# Patient Record
Sex: FEMALE | Race: WHITE | NOT HISPANIC OR LATINO
[De-identification: names, ages, dates, MRNs, and addresses within clinical notes are randomized per-mention and may not be internally consistent; named-entity substitution may affect disease eponyms.]

---

## 2017-03-22 PROBLEM — Z00.00 ENCOUNTER FOR PREVENTIVE HEALTH EXAMINATION: Noted: 2017-03-22

## 2017-03-23 ENCOUNTER — NON-APPOINTMENT (OUTPATIENT)
Age: 62
End: 2017-03-23

## 2017-03-23 ENCOUNTER — LABORATORY RESULT (OUTPATIENT)
Age: 62
End: 2017-03-23

## 2017-03-23 ENCOUNTER — APPOINTMENT (OUTPATIENT)
Dept: FAMILY MEDICINE | Facility: CLINIC | Age: 62
End: 2017-03-23

## 2017-03-23 VITALS — DIASTOLIC BLOOD PRESSURE: 106 MMHG | SYSTOLIC BLOOD PRESSURE: 170 MMHG | HEIGHT: 61 IN

## 2017-03-23 VITALS — SYSTOLIC BLOOD PRESSURE: 148 MMHG | DIASTOLIC BLOOD PRESSURE: 78 MMHG

## 2017-03-23 DIAGNOSIS — I10 ESSENTIAL (PRIMARY) HYPERTENSION: ICD-10-CM

## 2017-03-23 DIAGNOSIS — E03.9 HYPOTHYROIDISM, UNSPECIFIED: ICD-10-CM

## 2017-03-23 DIAGNOSIS — Z80.6 FAMILY HISTORY OF LEUKEMIA: ICD-10-CM

## 2017-03-23 RX ORDER — HYDROCHLOROTHIAZIDE 12.5 MG/1
12.5 CAPSULE ORAL DAILY
Refills: 0 | Status: ACTIVE | COMMUNITY

## 2017-03-23 RX ORDER — ATENOLOL 50 MG/1
50 TABLET ORAL TWICE DAILY
Refills: 0 | Status: ACTIVE | COMMUNITY

## 2017-03-23 RX ORDER — ENALAPRIL MALEATE 20 MG/1
20 TABLET ORAL TWICE DAILY
Refills: 0 | Status: ACTIVE | COMMUNITY

## 2017-03-30 ENCOUNTER — APPOINTMENT (OUTPATIENT)
Dept: FAMILY MEDICINE | Facility: CLINIC | Age: 62
End: 2017-03-30

## 2017-03-31 LAB
ANION GAP SERPL CALC-SCNC: 18 MMOL/L
BUN SERPL-MCNC: 21 MG/DL
CALCIUM SERPL-MCNC: 10.1 MG/DL
CHLORIDE SERPL-SCNC: 102 MMOL/L
CO2 SERPL-SCNC: 21 MMOL/L
CREAT SERPL-MCNC: 0.9 MG/DL
GLUCOSE SERPL-MCNC: 93 MG/DL
POTASSIUM SERPL-SCNC: 4.5 MMOL/L
SODIUM SERPL-SCNC: 141 MMOL/L
T3RU NFR SERPL: 1.11 INDEX
T4 FREE SERPL-MCNC: 0.7 NG/DL
THYROGLOB AB SERPL-ACNC: 6655 IU/ML
THYROPEROXIDASE AB SERPL IA-ACNC: 3696 IU/ML
TSH SERPL-ACNC: 0.78 UIU/ML

## 2017-04-07 RX ORDER — THYROID, PORCINE 60 MG/1
60 TABLET ORAL TWICE DAILY
Qty: 60 | Refills: 1 | Status: ACTIVE | COMMUNITY

## 2018-01-30 ENCOUNTER — APPOINTMENT (OUTPATIENT)
Dept: FAMILY MEDICINE | Facility: CLINIC | Age: 63
End: 2018-01-30

## 2018-10-29 ENCOUNTER — RESULT REVIEW (OUTPATIENT)
Age: 63
End: 2018-10-29

## 2020-07-10 ENCOUNTER — APPOINTMENT (OUTPATIENT)
Dept: ORTHOPEDIC SURGERY | Facility: CLINIC | Age: 65
End: 2020-07-10
Payer: MEDICARE

## 2020-07-10 VITALS
HEART RATE: 60 BPM | WEIGHT: 170 LBS | SYSTOLIC BLOOD PRESSURE: 153 MMHG | DIASTOLIC BLOOD PRESSURE: 83 MMHG | BODY MASS INDEX: 32.1 KG/M2 | HEIGHT: 61 IN

## 2020-07-10 VITALS — TEMPERATURE: 98.9 F

## 2020-07-10 PROCEDURE — 99204 OFFICE O/P NEW MOD 45 MIN: CPT

## 2020-07-10 PROCEDURE — 73521 X-RAY EXAM HIPS BI 2 VIEWS: CPT

## 2020-07-10 NOTE — PHYSICAL EXAM
[de-identified] : The patient appears well nourished and in no apparent distress. The patient is alert and oriented to person, place, and time. Affect and mood appear normal. The head is normocephalic and atraumatic. The eyes reveal normal sclera and extra ocular muscles are intact. The mucous membranes are moist. Skin shows normal turgor with no evidence of eczema or psoriasis. No respiratory distress noted. Sensation grossly intact. MUSCULOSKELETAL:   SEE BELOW\par \par \par Bilateral hip exam demonstrates equal leg lengths. No unilateral atrophy. No tenderness of the hip area with palpation. Passive range of motion of the lefft hip is 15° of internal rotation to approximately 30° of external rotation. Right hip motion is 10° and internal rotation to proximal a 45° of external rotation. Hip flexion at neutral against resistance is 4+/5 bilaterally. Hip flexion at 45° against resistance is 5/5. Hip abduction and adduction is 5/5. No weakness. No tenderness of the greater trochanteric bursas. Normal sensation to light touch bilaterally. No focal deficits. No foot drop. [de-identified] : X-rays of the pelvis and hips were reviewed. Right hip demonstrates a metal-on-metal hip prosthesis. No lucencies appreciated. No fractures. Left hip demonstrates a ceramic on polyethylene construct. No fractures.

## 2020-07-10 NOTE — DISCUSSION/SUMMARY
[de-identified] : Today's x-rays reviewed with patient. No clear signs of pathology from the previous total hip arthroplasties. She is known to have degenerative spine disease. There is concern that her symptoms are consistent with lumbar radiculopathy and possible spinal stenosis. The patient is referred for an MRI of the lumbar spine. She will then follow up with Dr. Arevalo. The patient is also prescribed Mobic. She will use this for pain control. If the patient should develop any specific groin pain or changes in her current symptoms as related to her previous hip replacements, she'll be seen back sooner. All questions were answered to the patient's satisfaction.

## 2020-07-10 NOTE — HISTORY OF PRESENT ILLNESS
[de-identified] : Patient presents today for evaluation of bilateral hip and anterior thigh discomfort. She's had this pain for 6-8 months. She reports at times does not feel steady on her legs. She occasionally has some lower back pain. She does report some radicular symptoms along the left lateral hip area. She does not report any specific right groin pain. She intermittently has some left groin pain. She believes that she has lost some left hip motion since her surgery. She underwent a posterior right hip replacement in 2009 with Dr. Chirinos. She underwent an anterior left hip replacement in 2016 with Dr. Cody. She has not had any dislocations or traumatic injuries to either hip. She does not currently use a cane or walker. She does not take any pain medicine. No fevers or chills.\par \par Review of Systems-\par Constitutional: No fever or chills. \par Cardiovascular: No orthopnea or chest pain\par Pulmonary: No shortness of breath. \par GI: No nausea or vomiting or abdominal pain.\par Musculoskeletal: see HPI \par Psychiatric: No anxiety and depression.

## 2020-09-17 ENCOUNTER — APPOINTMENT (OUTPATIENT)
Dept: MRI IMAGING | Facility: CLINIC | Age: 65
End: 2020-09-17
Payer: MEDICARE

## 2020-09-17 ENCOUNTER — OUTPATIENT (OUTPATIENT)
Dept: OUTPATIENT SERVICES | Facility: HOSPITAL | Age: 65
LOS: 1 days | End: 2020-09-17
Payer: MEDICARE

## 2020-09-17 DIAGNOSIS — Z98.49 CATARACT EXTRACTION STATUS, UNSPECIFIED EYE: Chronic | ICD-10-CM

## 2020-09-17 DIAGNOSIS — Z96.649 PRESENCE OF UNSPECIFIED ARTIFICIAL HIP JOINT: Chronic | ICD-10-CM

## 2020-09-17 PROCEDURE — 72148 MRI LUMBAR SPINE W/O DYE: CPT | Mod: 26

## 2020-09-17 PROCEDURE — 72148 MRI LUMBAR SPINE W/O DYE: CPT

## 2020-10-08 ENCOUNTER — APPOINTMENT (OUTPATIENT)
Dept: ORTHOPEDIC SURGERY | Facility: CLINIC | Age: 65
End: 2020-10-08
Payer: MEDICARE

## 2020-10-08 VITALS
WEIGHT: 170 LBS | HEART RATE: 57 BPM | HEIGHT: 61 IN | BODY MASS INDEX: 32.1 KG/M2 | DIASTOLIC BLOOD PRESSURE: 85 MMHG | SYSTOLIC BLOOD PRESSURE: 152 MMHG

## 2020-10-08 VITALS — TEMPERATURE: 96.2 F

## 2020-10-08 DIAGNOSIS — Z86.39 PERSONAL HISTORY OF OTHER ENDOCRINE, NUTRITIONAL AND METABOLIC DISEASE: ICD-10-CM

## 2020-10-08 DIAGNOSIS — Z80.6 FAMILY HISTORY OF LEUKEMIA: ICD-10-CM

## 2020-10-08 DIAGNOSIS — Z86.79 PERSONAL HISTORY OF OTHER DISEASES OF THE CIRCULATORY SYSTEM: ICD-10-CM

## 2020-10-08 PROCEDURE — 99214 OFFICE O/P EST MOD 30 MIN: CPT

## 2020-10-08 PROCEDURE — 72100 X-RAY EXAM L-S SPINE 2/3 VWS: CPT

## 2020-10-08 NOTE — DISCUSSION/SUMMARY
[de-identified] : Patient has been referred to physiatry for assessment regarding injections to help manage pain. I do not think this pt is an operative candidate I wouldn't’ recommend a multiple level fusion surgery in an adult Rotoscoliosis patient to address bilateral foraminal narrowing.

## 2020-10-08 NOTE — PHYSICAL EXAM
[Obese] : obese [Poor Appearance] : well-appearing [Acute Distress] : not in acute distress [de-identified] : CONSTITUTIONAL: The patient is a very pleasant individual who is well-nourished and who appears stated age.\par PSYCHIATRIC: The patient is alert and oriented X 3 and in no apparent distress, and participates with orthopedic evaluation well.\par HEAD: Atraumatic and is nonsyndromic in appearance.\par EENT: No visible thyromegaly, EOMI.\par RESPIRATORY: Respiratory rate is regular, not dyspneic on examination.\par LYMPHATICS: There is no inguinal lymphadenopathy\par INTEGUMENTARY: Skin is clean, dry, and intact about the bilateral lower extremities and lumbar spine.\par VASCULAR: There is brisk capillary refill about the bilateral lower extremities.\par NEUROLOGIC: There are no pathologic reflexes. Deep tendon reflexes are well maintained at 2+/4 of the bilateral lower extremities and are symmetric.\par MUSCULOSKELETAL: There is no visible muscular atrophy. Manual motor strength is well maintained in the bilateral lower extremities. Range of motion of lumbar spine is well maintained. The patient ambulates in a non-myelopathic manner. Negative tension sign and straight leg raise bilaterally. Quad extension, ankle dorsiflexion, EHL, plantar flexion, and ankle eversion are well preserved. Normal secondary orthopaedic exam of bilateral hips, greater trochanteric area, knees and ankles \par \par Pain start in the lateral aspects of the hips and radiating to the legs, subjective radiculopathy in the left leg to the bottom of the foot.  [de-identified] : Lumbar MRI from [facility] done on 09/17/2020 demonstrates lumbar degenerative disc disease, no signifcant central canal stenosis, moderate stenosis at L4-L5. \par \par Xray of the lumbar spine taken 10/08/2020 demonstrates degenerative Rotoscoliosis with a convexity to the left.

## 2020-10-08 NOTE — ADDENDUM
[FreeTextEntry1] : Documented by Remington Robledo acting as a scribe for Dr. Jordan Arevalo on 10/08/2020. All medical record entries made by the Scribe were at my, Dr. Jordan Arevalo, direction and personally dictated by me on 10/08/2020 . I have reviewed the chart and agree that the record accurately reflects my personal performance of the history, physical exam, assessment and plan. I have also personally directed, reviewed, and agreed with the chart.

## 2020-10-08 NOTE — HISTORY OF PRESENT ILLNESS
[de-identified] : 65 year old  F Presents from a referral from Dr. Cody with paresthesia in BL LE persisting close to a year.  Patient states the pain is different in each LE. RLE patient states localized pain in the thigh, in the LLE she complains of paresthesia, feelings of temperature changes, and trouble with her footing. No conservative  treatment at this time. JANINE questionnaire negative.  [Ataxia] : no ataxia [Incontinence] : no incontinence [Loss of Dexterity] : good dexterity [Urinary Ret.] : no urinary retention

## 2020-10-21 ENCOUNTER — APPOINTMENT (OUTPATIENT)
Dept: PHYSICAL MEDICINE AND REHAB | Facility: CLINIC | Age: 65
End: 2020-10-21
Payer: MEDICARE

## 2020-10-21 VITALS
DIASTOLIC BLOOD PRESSURE: 81 MMHG | TEMPERATURE: 96.3 F | WEIGHT: 170 LBS | HEIGHT: 61 IN | BODY MASS INDEX: 32.1 KG/M2 | HEART RATE: 58 BPM | SYSTOLIC BLOOD PRESSURE: 132 MMHG

## 2020-10-21 PROCEDURE — 99204 OFFICE O/P NEW MOD 45 MIN: CPT

## 2020-10-21 RX ORDER — MELOXICAM 7.5 MG/1
7.5 TABLET ORAL TWICE DAILY
Qty: 30 | Refills: 1 | Status: DISCONTINUED | COMMUNITY
Start: 2020-07-10 | End: 2020-10-21

## 2020-10-21 NOTE — HISTORY OF PRESENT ILLNESS
[FreeTextEntry1] : Ms. LEANDRA WHITTAKER is a 65 year old  female who presents with tingling in his bilateral legs\par \par Location:Bilateral legs, on left side runs from anteriolateral thigh to lateral/anterior leg into the bottom of her left foot. On the right side, only pain in the anterior thigh\par Onset: Started about 6 months ago, no trauma or inciting event\par Provocation/Palliative:Doesn't change with position, can wake her up at night. \par Quality:Tingling in left leg/tightness\par Radiation:Down her left lateral/anterior thigh down lateral/anterior lower leg into bottom of left toes \par Severity:4-5/10\par Timing:Discomfort as been staying about the same\par \par Denies any associated leg weakness but admits to instability. Denies any loss of bowel/bladder control or any groin numbness.\par Previous medications trialed:Mobic/Motrin/Tylenol\par Previous procedures relevant to complaint:Hip injections in past with no relief\par Conservative therapy tried?:NSAIDs\par

## 2020-10-21 NOTE — DATA REVIEWED
[MRI] : MRI [FreeTextEntry1] : MRI L Spine 9/2020: multilevel spondylosis, bilateral foraminal narrowing L4-5\par \par \par   MR Lumbar Spine No Cont             Final\par \par No Documents Attached\par \par \par \par \par   EXAM:  MR SPINE LUMBAR\par \par \par PROCEDURE DATE:  09/17/2020\par \par \par \par INTERPRETATION:  CLINICAL INFORMATION: LUMBAR SPONDYLOSIS. . ADMDIAG1: M47.816 SPONDYLOSIS W/O MYELOPATHY OR RADICULOPATHY, LUMBAR REGION/. Bilateral anterior thigh pain.\par \par TECHNIQUE: Multiplanar multisequence MR of the lumbar spine was performed without intravenous contrast.\par \par COMPARISON: MRI lumbar spine 8/16/2015\par \par FINDINGS:\par \par Lumbar levoscoliosis. Grade 1 retrolisthesis at L2-L3 and L3-L4. Vertebral body heights are within normal limits. Heterogeneous T1 marrow signal. Multilevel intervertebral disc desiccation.\par \par Evaluation of individual levels demonstrates:\par \par L1-L2: Disc bulge. Mild indentation of the thecal sac. No significant neuroforaminal narrowing.\par \par L2-L3: Disc bulge. Mild indentation of the thecal sac. Mild left and moderate right neuroforaminal narrowing.\par \par L3-L4: Disc bulge. Mild indentation of the thecal sac. Mild bilateral neuroforaminal narrowing.\par \par L4-L5: Disc bulge. Bilateral facet arthrosis. Mild indentation of the thecal sac. Mild left and moderate right neuroforaminal narrowing.\par \par L5-S1: Disc bulge. Bilateral facet arthrosis. No significant spinal canal or neuroforaminal narrowing.\par \par The conus is normal in position and morphology at the L1-L2 level.\par \par There is no definite fluid collection or mass lesion within the visualized retroperitoneal or posterior paraspinal soft tissues.\par \par \par IMPRESSION: Lumbar degenerative changes. Bilateral neuroforaminal narrowing at L2-L3 through L4-L5.\par \par \par \par \par \par \par KAYLYNN FERRARA M.D., ATTENDING RADIOLOGIST\par This document has been electronically signed. Sep 17 2020  3:27PM\par \par  \par \par  Ordered by: BRITNEY ALMANZA       Collected/Examined: 17Sep2020 11:15AM       \par Verified by: BRITNEY ALMANZA [id number] 10:57AM       \par  Result Communication: No patient communication needed at this time;\par Stage: Final       \par  Performed at: [facility]       Resulted: [id number] 03:31PM       Last Updated: 19Sep2020 10:57AM       Accession: [id number]

## 2020-10-21 NOTE — PHYSICAL EXAM
[FreeTextEntry1] : PE:\par Constitutional: In NAD, calm and cooperative\par HEENT: NCAT, Anicteric sclera, no lid-lag\par Cardio: Extremities appear pink and well perfused, no peripheral edema\par Respiratory: Normal respiratory effort on room air, no accessory muscle use\par Skin: no rashes seen on exposed skin, no visible abrasions\par Psych: Normal affect, intact judgment and insight\par Neuro: Awake, alert and oriented x 3, see below for focused neurological exam\par MSK (Low back):\par 	Inspection: no gross swelling identified\par 	Palpation: No tenderness of the bilateral  lumbar paraspinals\par 	ROM: No pain with lumbar extension or flexion\par 	Strength: 5/5 strength in bilateral lower extremities except for L hip flexion which is 4/5, as well as left planatflexion with is 4+/5\par 	Reflexes: 1+ Patella reflex bilaterally, 1+ Achilles reflex bilaterally, negative clonus bilaterally\par 	Sensation: Intact to light touch in bilateral lower extremities but decreased to pinprick in bilateral LE circumferentially, L worse than R\par 	Special tests: seated SLR negative bilaterally, JOSSY negative bilaterally, FADIR negative bilaterally, Facet loading negative bilaterally\par

## 2020-10-21 NOTE — ASSESSMENT
[FreeTextEntry1] : Ms. LEANDRA WHITTAKER is a pleasant 65 year old  female who presents with bilateral leg tingling/pain for 6 months without any inciting event. Patient seen by ortho spine who did not recommend any type of surgery. MRI L Spine shows some rotoscoliosis and mild to moderate bilateral foraminal narrowing in the lower lumbar segments. Patient's discomfort could be from a lumbar radiculopathy, although she denies any LBP. Since patient could have an underlying neuropathy, will send for NCS/EMG studies as well as refer patient to neurologist for possible neuropathy workup. In the meantime, patient will start PT focusing on balance and gait training to help prevent any falls. I offered patient a trial of gabapentin, but patient does not wish to try any new medication at this time. She will f/u with me in about a month once the NCS/EMG is done and she has seen neurology.

## 2020-10-27 ENCOUNTER — NON-APPOINTMENT (OUTPATIENT)
Age: 65
End: 2020-10-27

## 2020-10-28 ENCOUNTER — TRANSCRIPTION ENCOUNTER (OUTPATIENT)
Age: 65
End: 2020-10-28

## 2020-11-19 ENCOUNTER — APPOINTMENT (OUTPATIENT)
Dept: PHYSICAL MEDICINE AND REHAB | Facility: CLINIC | Age: 65
End: 2020-11-19
Payer: MEDICARE

## 2020-11-19 DIAGNOSIS — M62.838 OTHER MUSCLE SPASM: ICD-10-CM

## 2020-11-19 PROCEDURE — 95911 NRV CNDJ TEST 9-10 STUDIES: CPT

## 2020-11-19 PROCEDURE — 95909 NRV CNDJ TST 5-6 STUDIES: CPT

## 2020-11-25 ENCOUNTER — APPOINTMENT (OUTPATIENT)
Dept: PHYSICAL MEDICINE AND REHAB | Facility: CLINIC | Age: 65
End: 2020-11-25
Payer: MEDICARE

## 2020-11-25 VITALS
DIASTOLIC BLOOD PRESSURE: 87 MMHG | HEART RATE: 60 BPM | RESPIRATION RATE: 14 BRPM | HEIGHT: 61 IN | BODY MASS INDEX: 30.21 KG/M2 | SYSTOLIC BLOOD PRESSURE: 154 MMHG | WEIGHT: 160 LBS | TEMPERATURE: 96.4 F | OXYGEN SATURATION: 98 %

## 2020-11-25 PROCEDURE — 99214 OFFICE O/P EST MOD 30 MIN: CPT

## 2020-11-25 NOTE — ASSESSMENT
[FreeTextEntry1] : Ms. LEANDRA WHITTAKER is a 65 year old  female who presents with bilateral leg numbness/tingling of unknown cause, pending completion of an EMG. Patient has been improving with Gabapentin, now at 300mg TID. Patient was told that she can increase this again to 600mg TID (starting 300mg at a time), but patient says the discomfort is tolerable at this time. For now, will place referral to neurology for their opinion on any causes of this numbness/tingling. She will continue PT in the meantime for balance and gait training.  Patient to return after EMG/neurology consultation. Patient educated on red flag signs including any changes to his bowel/bladder control, groin numbness or new weakness. Patient knows to seek immediate attention by calling 911 or going to nearest ER if these symptoms appear.

## 2020-11-25 NOTE — DATA REVIEWED
[MRI] : MRI [FreeTextEntry1] : MRI L Spine 9/2020: multilevel spondylosis, bilateral foraminal narrowing L4-5\par \par \par   MR Lumbar Spine No Cont             Final\par \par No Documents Attached\par \par \par \par \par   EXAM:  MR SPINE LUMBAR\par \par \par PROCEDURE DATE:  09/17/2020\par \par \par \par INTERPRETATION:  CLINICAL INFORMATION: LUMBAR SPONDYLOSIS. . ADMDIAG1: M47.816 SPONDYLOSIS W/O MYELOPATHY OR RADICULOPATHY, LUMBAR REGION/. Bilateral anterior thigh pain.\par \par TECHNIQUE: Multiplanar multisequence MR of the lumbar spine was performed without intravenous contrast.\par \par COMPARISON: MRI lumbar spine 8/16/2015\par \par FINDINGS:\par \par Lumbar levoscoliosis. Grade 1 retrolisthesis at L2-L3 and L3-L4. Vertebral body heights are within normal limits. Heterogeneous T1 marrow signal. Multilevel intervertebral disc desiccation.\par \par Evaluation of individual levels demonstrates:\par \par L1-L2: Disc bulge. Mild indentation of the thecal sac. No significant neuroforaminal narrowing.\par \par L2-L3: Disc bulge. Mild indentation of the thecal sac. Mild left and moderate right neuroforaminal narrowing.\par \par L3-L4: Disc bulge. Mild indentation of the thecal sac. Mild bilateral neuroforaminal narrowing.\par \par L4-L5: Disc bulge. Bilateral facet arthrosis. Mild indentation of the thecal sac. Mild left and moderate right neuroforaminal narrowing.\par \par L5-S1: Disc bulge. Bilateral facet arthrosis. No significant spinal canal or neuroforaminal narrowing.\par \par The conus is normal in position and morphology at the L1-L2 level.\par \par There is no definite fluid collection or mass lesion within the visualized retroperitoneal or posterior paraspinal soft tissues.\par \par \par IMPRESSION: Lumbar degenerative changes. Bilateral neuroforaminal narrowing at L2-L3 through L4-L5.\par \par \par \par \par \par \par KAYLYNN FERRARA M.D., ATTENDING RADIOLOGIST\par This document has been electronically signed. Sep 17 2020  3:27PM\par \par  \par \par  Ordered by: BRITNEY ALMANZA       Collected/Examined: 17Sep2020 11:15AM       \par Verified by: BRITNEY ALMANZA [id number] 10:57AM       \par  Result Communication: No patient communication needed at this time;\par Stage: Final       \par  Performed at: [facility]       Resulted: [id number] 03:31PM       Last Updated: 19Sep2020 10:57AM       Accession: [id number]

## 2020-11-25 NOTE — PHYSICAL EXAM
[FreeTextEntry1] : PE:\par Constitutional: In NAD, calm and cooperative\par HEENT: NCAT, Anicteric sclera, no lid-lag\par Cardio: Extremities appear pink and well perfused, no peripheral edema\par Respiratory: Normal respiratory effort on room air, no accessory muscle use\par Skin: no rashes seen on exposed skin, no visible abrasions\par Psych: Normal affect, intact judgment and insight\par Neuro: Awake, alert and oriented x 3, see below for focused neurological exam\par MSK (Low back):\par 	Inspection: no gross swelling identified\par 	Palpation: No tenderness of the bilateral  lumbar paraspinals\par 	ROM: No pain with lumbar extension or flexion\par 	Strength: 5/5 strength in bilateral lower extremities except for L hip flexion which is 4/5, as well as left planatflexion with is 4+/5\par 	Reflexes: 1+ Patella reflex bilaterally, 1+ Achilles reflex bilaterally, negative clonus bilaterally\par 	Sensation: Intact to light touch in bilateral lower extremities\par 	Special tests: seated SLR negative bilaterally, JOSSY negative bilaterally, FADIR negative bilaterally\par

## 2020-11-25 NOTE — HISTORY OF PRESENT ILLNESS
[FreeTextEntry1] : Ms. LEANDRA WHITTAKER is a 65 year old  female who presents for follow up. She is pending the 2nd part of her EMG for which she will be getting done next week. \par \par Location:Bilateral legs, on left side runs from anteriolateral thigh to lateral/anterior leg into the bottom of her left foot. On the right side, only pain in the anterior thigh\par Onset: Started about 6 months ago, no trauma or inciting event\par Provocation/Palliative:Doesn't change with position, can wake her up at night. \par Quality:Tingling in left leg/tightness\par Radiation:Down her left lateral/anterior thigh down lateral/anterior lower leg into bottom of left toes \par Severity:2-3/10\par Timing:Discomfort has been improving with gabapentin. \par \par \par No bowel/bladder changes. No groin numbness.

## 2020-12-09 ENCOUNTER — APPOINTMENT (OUTPATIENT)
Dept: PHYSICAL MEDICINE AND REHAB | Facility: CLINIC | Age: 65
End: 2020-12-09
Payer: MEDICARE

## 2020-12-09 VITALS
DIASTOLIC BLOOD PRESSURE: 83 MMHG | HEART RATE: 62 BPM | SYSTOLIC BLOOD PRESSURE: 161 MMHG | TEMPERATURE: 97.6 F | WEIGHT: 160 LBS | HEIGHT: 61 IN | BODY MASS INDEX: 30.21 KG/M2

## 2020-12-09 DIAGNOSIS — R20.0 ANESTHESIA OF SKIN: ICD-10-CM

## 2020-12-09 DIAGNOSIS — M54.16 RADICULOPATHY, LUMBAR REGION: ICD-10-CM

## 2020-12-09 PROCEDURE — 95886 MUSC TEST DONE W/N TEST COMP: CPT

## 2020-12-09 PROCEDURE — 95911 NRV CNDJ TEST 9-10 STUDIES: CPT

## 2020-12-16 ENCOUNTER — APPOINTMENT (OUTPATIENT)
Dept: PHYSICAL MEDICINE AND REHAB | Facility: CLINIC | Age: 65
End: 2020-12-16

## 2021-01-07 ENCOUNTER — APPOINTMENT (OUTPATIENT)
Dept: NEUROLOGY | Facility: CLINIC | Age: 66
End: 2021-01-07
Payer: MEDICARE

## 2021-01-07 VITALS
DIASTOLIC BLOOD PRESSURE: 90 MMHG | BODY MASS INDEX: 30.21 KG/M2 | WEIGHT: 160 LBS | SYSTOLIC BLOOD PRESSURE: 140 MMHG | TEMPERATURE: 97.2 F | HEIGHT: 61 IN

## 2021-01-07 PROCEDURE — 99204 OFFICE O/P NEW MOD 45 MIN: CPT

## 2021-01-07 NOTE — ASSESSMENT
[FreeTextEntry1] : This is a 65-year-old woman with findings consistent with peripheral neuropathy which was suggested on EMG testing.\par She will require blood testing for secondary causes of neuropathy.\par \par She had tried gabapentin with some relief of the pain.\par I have recommended she resume 300 mg twice per day to start with. (She had some sedation with the medication).\par \par I will see her back in 3 months.

## 2021-01-07 NOTE — CONSULT LETTER
[Dear  ___] : Dear  [unfilled], [Courtesy Letter:] : I had the pleasure of seeing your patient, [unfilled], in my office today. [Please see my note below.] : Please see my note below. [Consult Closing:] : Thank you very much for allowing me to participate in the care of this patient.  If you have any questions, please do not hesitate to contact me. [Sincerely,] : Sincerely, [FreeTextEntry3] : Christopher Mccann MD.

## 2021-01-07 NOTE — DATA REVIEWED
[de-identified] : EMG and nerve conduction studies of the lower extremities or suspicious for mild peripheral neuropathy.\par \par Lumbar spine MRI was performed on 9/17/2020.\par The study demonstrated multilevel degenerative changes and disc bulges.

## 2021-01-07 NOTE — PHYSICAL EXAM
[General Appearance - Alert] : alert [General Appearance - In No Acute Distress] : in no acute distress [Oriented To Time, Place, And Person] : oriented to person, place, and time [Affect] : the affect was normal [Memory Recent] : recent memory was not impaired [Memory Remote] : remote memory was not impaired [Cranial Nerves Optic (II)] : visual acuity intact bilaterally,  visual fields full to confrontation, pupils equal round and reactive to light [Cranial Nerves Oculomotor (III)] : extraocular motion intact [Cranial Nerves Trigeminal (V)] : facial sensation intact symmetrically [Cranial Nerves Facial (VII)] : face symmetrical [Cranial Nerves Vestibulocochlear (VIII)] : hearing was intact bilaterally [Cranial Nerves Glossopharyngeal (IX)] : tongue and palate midline [Cranial Nerves Accessory (XI - Cranial And Spinal)] : head turning and shoulder shrug symmetric [Cranial Nerves Hypoglossal (XII)] : there was no tongue deviation with protrusion [Motor Tone] : muscle tone was normal in all four extremities [Motor Strength] : muscle strength was normal in all four extremities [Sensation Tactile Decrease] : light touch was intact [Sensation Pain / Temperature Decrease] : pain and temperature was intact [Abnormal Walk] : normal gait [1+] : Ankle jerk left 1+ [Optic Disc Abnormality] : the optic disc were normal in size and color [Dysarthria] : no dysarthria [Aphasia] : no dysphasia/aphasia [Romberg's Sign] : Romberg's sign was negtive [Coordination - Dysmetria Impaired Finger-to-Nose Bilateral] : not present [Plantar Reflex Right Only] : normal on the right [Plantar Reflex Left Only] : normal on the left

## 2021-01-08 LAB
ALBUMIN MFR SERPL ELPH: 55.1 %
ALBUMIN SERPL-MCNC: 4.3 G/DL
ALBUMIN/GLOB SERPL: 1.2 RATIO
ALPHA1 GLOB MFR SERPL ELPH: 3.6 %
ALPHA1 GLOB SERPL ELPH-MCNC: 0.3 G/DL
ALPHA2 GLOB MFR SERPL ELPH: 9.6 %
ALPHA2 GLOB SERPL ELPH-MCNC: 0.7 G/DL
B-GLOBULIN MFR SERPL ELPH: 13.6 %
B-GLOBULIN SERPL ELPH-MCNC: 1.1 G/DL
ERYTHROCYTE [SEDIMENTATION RATE] IN BLOOD BY WESTERGREN METHOD: 30 MM/HR
ESTIMATED AVERAGE GLUCOSE: 114 MG/DL
FOLATE SERPL-MCNC: 19.8 NG/ML
GAMMA GLOB FLD ELPH-MCNC: 1.4 G/DL
GAMMA GLOB MFR SERPL ELPH: 18.1 %
HBA1C MFR BLD HPLC: 5.6 %
INTERPRETATION SERPL IEP-IMP: NORMAL
PROT SERPL-MCNC: 7.8 G/DL
PROT SERPL-MCNC: 7.8 G/DL
VIT B12 SERPL-MCNC: 697 PG/ML

## 2021-01-11 ENCOUNTER — APPOINTMENT (OUTPATIENT)
Dept: NEUROLOGY | Facility: CLINIC | Age: 66
End: 2021-01-11

## 2021-01-15 ENCOUNTER — NON-APPOINTMENT (OUTPATIENT)
Age: 66
End: 2021-01-15

## 2021-01-15 LAB — METHYLMALONATE SERPL-SCNC: 272 NMOL/L

## 2021-04-15 ENCOUNTER — APPOINTMENT (OUTPATIENT)
Dept: NEUROLOGY | Facility: CLINIC | Age: 66
End: 2021-04-15
Payer: MEDICARE

## 2021-04-15 VITALS
TEMPERATURE: 97.5 F | WEIGHT: 160 LBS | HEIGHT: 61 IN | DIASTOLIC BLOOD PRESSURE: 70 MMHG | SYSTOLIC BLOOD PRESSURE: 128 MMHG | BODY MASS INDEX: 30.21 KG/M2

## 2021-04-15 DIAGNOSIS — R26.89 OTHER ABNORMALITIES OF GAIT AND MOBILITY: ICD-10-CM

## 2021-04-15 DIAGNOSIS — G62.9 POLYNEUROPATHY, UNSPECIFIED: ICD-10-CM

## 2021-04-15 PROCEDURE — 99213 OFFICE O/P EST LOW 20 MIN: CPT

## 2021-04-15 RX ORDER — GABAPENTIN 300 MG/1
300 CAPSULE ORAL TWICE DAILY
Qty: 60 | Refills: 3 | Status: COMPLETED | COMMUNITY
Start: 2020-10-27 | End: 2021-04-15

## 2021-04-15 NOTE — ASSESSMENT
[FreeTextEntry1] : This is a 66 year-old woman with findings consistent with peripheral neuropathy which was suggested on EMG testing.\par Blood testing for secondary causes was unrevealing.\par \par She will discontinue gabapentin for now.\par \par If she develops any significant neuropathic pain I would begin a trial of Cymbalta.\par \par I will see her back in 4 months.

## 2021-04-15 NOTE — HISTORY OF PRESENT ILLNESS
[FreeTextEntry1] : I saw this patient in the office today.\par \par As you recall, she reported that she underwent hip replacement on the right in January of 2009 and on the left in June of 2016.\par Ever since then she has noticed pain down the right thigh into the leg. She also has numbness and tingling in the left thigh radiating down into the toes. She has pain at the left hip radiating into the thigh.\par She also has difficulty with balance.\par She had seen a physiatrist and EMG was performed.\par She was found to have findings suspicious for mild peripheral neuropathy with demyelinating characteristics.\par \par She was also found to have degenerative changes in the lumbar spine.\par \par She did physical therapy, but reports there was no improvement.\par \par She reports that gabapentin has not been doing much for her, however, she was expecting it to get rid of the numbness.\par She does not have much in the way of neuropathic pain.

## 2021-04-15 NOTE — DATA REVIEWED
[de-identified] : EMG and nerve conduction studies of the lower extremities or suspicious for mild peripheral neuropathy.\par \par Lumbar spine MRI was performed on 9/17/2020.\par The study demonstrated multilevel degenerative changes and disc bulges.\par \par A1C: 5.6\par B12: 697\par ESR: 30\par SPEP: normal.\par

## 2021-05-12 ENCOUNTER — NON-APPOINTMENT (OUTPATIENT)
Age: 66
End: 2021-05-12

## 2021-08-14 ENCOUNTER — RX RENEWAL (OUTPATIENT)
Age: 66
End: 2021-08-14

## 2021-08-19 ENCOUNTER — APPOINTMENT (OUTPATIENT)
Dept: NEUROLOGY | Facility: CLINIC | Age: 66
End: 2021-08-19

## 2021-12-15 ENCOUNTER — RX RENEWAL (OUTPATIENT)
Age: 66
End: 2021-12-15

## 2022-02-11 ENCOUNTER — RX RENEWAL (OUTPATIENT)
Age: 67
End: 2022-02-11

## 2022-03-17 ENCOUNTER — RX RENEWAL (OUTPATIENT)
Age: 67
End: 2022-03-17

## 2022-05-12 ENCOUNTER — RX RENEWAL (OUTPATIENT)
Age: 67
End: 2022-05-12

## 2022-07-28 RX ORDER — AZITHROMYCIN 500 MG/1
500 TABLET, FILM COATED ORAL
Qty: 6 | Refills: 3 | Status: ACTIVE | COMMUNITY
Start: 2022-07-28 | End: 1900-01-01

## 2022-08-12 ENCOUNTER — NON-APPOINTMENT (OUTPATIENT)
Age: 67
End: 2022-08-12

## 2022-08-12 ENCOUNTER — APPOINTMENT (OUTPATIENT)
Dept: ORTHOPEDIC SURGERY | Facility: CLINIC | Age: 67
End: 2022-08-12

## 2022-08-12 PROCEDURE — 73521 X-RAY EXAM HIPS BI 2 VIEWS: CPT

## 2022-08-12 PROCEDURE — 99213 OFFICE O/P EST LOW 20 MIN: CPT | Mod: 25

## 2022-08-12 PROCEDURE — 20610 DRAIN/INJ JOINT/BURSA W/O US: CPT | Mod: RT

## 2022-08-12 PROCEDURE — 73562 X-RAY EXAM OF KNEE 3: CPT | Mod: RT

## 2022-08-12 NOTE — PROCEDURE
[Injection] : Injection [Right] : of the right [Effusion] : Effusion [Osteoarthritis] : Osteoarthritis [Inflammation] : Inflammation [Diagnostic] : Diagnostic [Joint Pain] : Joint pain [Patient] : patient [Risk] : risk [Benefits] : benefits [Bleeding] : bleeding [Infection] : infection [Verbal Consent Obtained] : verbal consent was obtained prior to the procedure [Ethyl Chloride Spray] : ethyl chloride spray was used as a topical anesthetic [Medial] : medial [22] : a 22-gauge [1% Lidocaine___(mL)] : [unfilled] mL of 1% Lidocaine [Methylpred. 40mg/mL___(mL)] : [unfilled] mL of 40mg/mL methylprednisolone [Bandage Applied] : a bandage [Tolerated Well] : The patient tolerated the procedure well [None] : none [PRN] : as needed [FreeTextEntry1] : Chlorhexidine

## 2022-08-12 NOTE — PHYSICAL EXAM
[UE/LE] : Sensory: Intact in bilateral upper & lower extremities [Rad] : radial 2+ and symmetric bilaterally [Normal RUE] : Right Upper Extremity: No scars, rashes, lesions, ulcers, skin intact [Normal LUE] : Left Upper Extremity: No scars, rashes, lesions, ulcers, skin intact [Normal RLE] : Right Lower Extremity: No scars, rashes, lesions, ulcers, skin intact [Normal LLE] : Left Lower Extremity: No scars, rashes, lesions, ulcers, skin intact [Normal] : No costovertebral angle tenderness and no spinal tenderness [de-identified] : Well-healed incision to the right hip without erythema, drainage, or evidence of cellulitis.  No pain to palpation of the greater trochanteric region or the iliotibial band.  Patient has full range of motion of the right hip without pain, discomfort, instability.  Patient has a well-healed incision over the anterior thigh on the left.  No erythema drainage or evidence of cellulitis.  Patient has full range of motion of the left hip without pain, discomfort, instability.  She can flex past 100 degrees, external rotation 35 degrees, internal rotation 25 degrees, AB duction 30 degrees, adduction 25 degrees.  Calves are soft, nontender, no evidence of DVT at this time.  Patient has good motor and sensory to both legs.  Skin is clean, dry, intact to the right knee.  There is no erythema.  Crepitation at the patellofemoral joint with flexion and extension maneuvers.  No medial or lateral joint line tenderness.  Range of motion 0-1 35.  Less than 5 degree laxity with varus valgus stresses.  Negative anteroposterior drawer.  No extension lag.  No flexion contractures.\par  [de-identified] : Intact [de-identified] : Right Magnum total hip replacement, and anatomical alignment, excellent bone to prosthesis interface, there is no gross evidence of prosthetic failure, all the components anatomically aligned.\par \par Left total hip replacement, in anatomical alignment, excellent bone to prosthesis interface, there is no gross evidence of prosthetic failure, all the components anatomically aligned.\par \par Radiographs of the right knee show well-maintained joint space between the medial and lateral femoral-tibial compartments slight joint space narrowing at the patellofemoral joint.  No gross evidence of AVN\par \par \par \par \par

## 2022-08-12 NOTE — DISCUSSION/SUMMARY
[Medication Risks Reviewed] : Medication risks reviewed [Surgical risks reviewed] : Surgical risks reviewed [de-identified] : The patient is doing well status post bilateral staged total hip replacements.  The patient does have a magna metal-on-metal hip replacement we ordered blood work just as a follow-up of chromium levels, nickel and cobalt.  If these levels are elevated we will obtain an MRI to rule out any periprosthetic issues.  The patient has discomfort to the right knee for which we gave her cortisone injection to temporize her symptoms.  She will do an exercise program of walking and strength training.  If her symptoms persist in the right knee we may obtain an MRI to rule out a meniscal tear, evaluate the articular surfaces and rule out AVN.  All of her questions were answered to her satisfaction.

## 2022-08-12 NOTE — HISTORY OF PRESENT ILLNESS
[Stable] : stable [7] : a current pain level of 7/10 [5] : an average pain level of 5/10 [3] : a minimum pain level of 3/10 [9] : a maximum pain level of 9/10 [Sitting] : sitting [Standing] : standing [Lifting] : lifting [Intermit.] : ~He/She~ states the symptoms seem to be intermittent [Direct Pressure] : worsened by direct pressure [Running] : worsened by running [Walking] : worsened by walking [Acetaminophen] : relieved by acetaminophen [Exercise Regimen] : relieved by exercise regimen [Heat] : relieved by heat [Ice] : relieved by ice [NSAIDs] : relieved by nonsteroidal anti-inflammatory drugs [Rest] : relieved by rest [de-identified] : The patient is a 67-year-old female who presents today for follow-up of both of her hips and new onset right knee pain.  Patient is well-known to this office.  She status post a right posterior metal-on-metal total hip replacement done in 2009 by Dr. Nathan Reyez.  Patient had a left anterior total hip replacement done in 2016 with us.  Patient states for the most part her hips are doing well.  She has new onset right knee pain for several weeks which she reports is sudden.  The patient denies any specific injury or trauma.  She does recall that the pain radiates up her leg and goes down to her foot.  I asked her if she had any back issues and she does have stenosis.  Patient is here for follow-up [Ataxia] : no ataxia [Incontinence] : no incontinence [Loss of Dexterity] : good dexterity [Urinary Ret.] : no urinary retention

## 2022-08-12 NOTE — REASON FOR VISIT
[Initial Visit] : an initial visit for [Artificial Hip Joint] : artificial hip joint [Hip Pain] : hip pain [Knee Pain] : knee pain [FreeTextEntry2] : Consult bilateral hip pain and right knee pain. Right THR 2009. Left THR 2016

## 2022-08-29 ENCOUNTER — NON-APPOINTMENT (OUTPATIENT)
Age: 67
End: 2022-08-29

## 2022-09-16 ENCOUNTER — APPOINTMENT (OUTPATIENT)
Dept: ORTHOPEDIC SURGERY | Facility: CLINIC | Age: 67
End: 2022-09-16

## 2022-10-05 ENCOUNTER — RX RENEWAL (OUTPATIENT)
Age: 67
End: 2022-10-05

## 2022-11-11 ENCOUNTER — APPOINTMENT (OUTPATIENT)
Dept: ORTHOPEDIC SURGERY | Facility: CLINIC | Age: 67
End: 2022-11-11

## 2022-11-11 PROCEDURE — 20610 DRAIN/INJ JOINT/BURSA W/O US: CPT | Mod: RT

## 2022-11-11 PROCEDURE — 99214 OFFICE O/P EST MOD 30 MIN: CPT | Mod: 25

## 2022-11-11 RX ADMIN — Medication %: at 00:00

## 2022-11-11 RX ADMIN — METHYLPREDNISOLONE ACETATE MG/ML: 40 INJECTION, SUSPENSION INTRA-ARTICULAR; INTRALESIONAL; INTRAMUSCULAR; SOFT TISSUE at 00:00

## 2022-11-11 NOTE — DISCUSSION/SUMMARY
[Medication Risks Reviewed] : Medication risks reviewed [de-identified] : The patient was advised on the activities for today. I gave the patient instructions on postinjection ice and analgesia.\par A MRI of the right knee was ordered to evaluate meniscal injury vs articular surfaces \par I provided home exercises sheets for the patient to use at home until the MRI results are available.\par The patient had her questions answered and gave a clear understanding of the instructions. She was advised she can call the office at any time with any questions or concerns\par  The patient will follow up once she obtains the MRI of the right knee for treatment plan.

## 2022-11-11 NOTE — PROCEDURE
[Injection] : Injection [Knee Joint] : knee joint [Osteoarthritis] : Osteoarthritis [Joint Pain] : Joint pain [Patient] : patient [Risk] : risk [Benefits] : benefits [Bleeding] : bleeding [Infection] : infection [Allergic Reaction] : allergic reaction [Verbal Consent Obtained] : verbal consent was obtained prior to the procedure [Ethyl Chloride Spray] : ethyl chloride spray was used as a topical anesthetic [22] : a 22-gauge [Lateral] : lateral [1% Lidocaine___(mL)] : [unfilled] mL of 1% Lidocaine [Methylpred. 40mg/mL___(mL)] : [unfilled] mL of 40mg/mL methylprednisolone [Bandage Applied] : a bandage [Tolerated Well] : The patient tolerated the procedure well [None] : none [___ Month(s)] : in [unfilled] month(s) [FreeTextEntry1] : chlorhexadine

## 2022-11-11 NOTE — HISTORY OF PRESENT ILLNESS
[de-identified] : The patient is a 67 yr old female presents today for evaluation of the right knee.She reports she is experiencing severe right knee pain  for the past few days. She was givien a cortisone injection to the right knee in 08/2022 with good relief until a few days ago.\par She rates her pain an 8/10.\par She was last evaluated with Dr Cody in August 2022, at that time she was told to call the office if her symptoms persist and we would obtain an MRI. [Pain Location] : pain [] : right knee [Worsening] : worsening [5] : a minimum pain level of 5/10 [8] : a maximum pain level of 8/10 [Standing] : standing [Daily] : ~He/She~ states the symptoms seem to be occuring daily [Bending] : worsened by bending [Direct Pressure] : worsened by direct pressure [Walking] : worsened by walking [Knee Flexion] : worsened with knee flexion [Knee Extension] : worsened with knee extension [Acetaminophen] : relieved by acetaminophen [Ice] : relieved by ice

## 2022-11-11 NOTE — PHYSICAL EXAM
[Normal] : Gait: normal [LE] : Sensory: Intact in bilateral lower extremities [DP] : dorsalis pedis 2+ and symmetric bilaterally [Knee Swelling Right] : swelling [Knee Tenderness On Palpation Right] : tenderness [Knee Motion Right Crepitus] : crepitus with ROM [Knee Motion Right] : full ROM [de-identified] : No radiographs were obtained at todays visit.

## 2022-12-15 RX ORDER — LIDOCAINE HYDROCHLORIDE 10 MG/ML
1 INJECTION, SOLUTION INFILTRATION; PERINEURAL
Refills: 0 | Status: COMPLETED | OUTPATIENT
Start: 2022-11-11

## 2022-12-15 RX ORDER — METHYLPRED ACET/NACL,ISO-OS/PF 40 MG/ML
40 VIAL (ML) INJECTION
Qty: 1 | Refills: 0 | Status: COMPLETED | OUTPATIENT
Start: 2022-11-11

## 2023-02-08 ENCOUNTER — APPOINTMENT (OUTPATIENT)
Dept: PULMONOLOGY | Facility: CLINIC | Age: 68
End: 2023-02-08
Payer: MEDICARE

## 2023-02-08 VITALS
HEIGHT: 61 IN | RESPIRATION RATE: 14 BRPM | DIASTOLIC BLOOD PRESSURE: 74 MMHG | OXYGEN SATURATION: 98 % | HEART RATE: 68 BPM | SYSTOLIC BLOOD PRESSURE: 126 MMHG

## 2023-02-08 PROCEDURE — 99203 OFFICE O/P NEW LOW 30 MIN: CPT

## 2023-02-08 NOTE — CONSULT LETTER
[Dear  ___] : Dear  [unfilled], [Consult Letter:] : I had the pleasure of evaluating your patient, [unfilled]. [Please see my note below.] : Please see my note below. [Consult Closing:] : Thank you very much for allowing me to participate in the care of this patient.  If you have any questions, please do not hesitate to contact me. [Sincerely,] : Sincerely, [DrJenaro  ___] : Dr. WALTER

## 2023-02-08 NOTE — PHYSICAL EXAM
[No Acute Distress] : no acute distress [Elongated Uvula] : elongated uvula [Enlarged Base of the Tongue] : enlarged base of the tongue [II] : Mallampati Class: II [Normal Appearance] : normal appearance [No Neck Mass] : no neck mass [Normal Rate/Rhythm] : normal rate/rhythm [Normal S1, S2] : normal s1, s2 [No Resp Distress] : no resp distress [Clear to Auscultation Bilaterally] : clear to auscultation bilaterally

## 2023-02-20 ENCOUNTER — OUTPATIENT (OUTPATIENT)
Dept: OUTPATIENT SERVICES | Facility: HOSPITAL | Age: 68
LOS: 1 days | End: 2023-02-20
Payer: MEDICARE

## 2023-02-20 DIAGNOSIS — Z98.49 CATARACT EXTRACTION STATUS, UNSPECIFIED EYE: Chronic | ICD-10-CM

## 2023-02-20 DIAGNOSIS — G47.33 OBSTRUCTIVE SLEEP APNEA (ADULT) (PEDIATRIC): ICD-10-CM

## 2023-02-20 DIAGNOSIS — Z96.649 PRESENCE OF UNSPECIFIED ARTIFICIAL HIP JOINT: Chronic | ICD-10-CM

## 2023-02-20 PROCEDURE — 95800 SLP STDY UNATTENDED: CPT

## 2023-03-01 NOTE — HISTORY OF PRESENT ILLNESS
[Obstructive Sleep Apnea] : obstructive sleep apnea [Awakes Unrefreshed] : awakes unrefreshed [Awakes with Dry Mouth] : awakes with dry mouth [Daytime Somnolence] : daytime somnolence [Nonrestorative Sleep] : nonrestorative sleep [Recent  Weight Gain] : recent weight gain [Snoring] : snoring [TextBox_4] : 2/8/23\par 68 year old female\par HTN\par Hypothyroid\par GERD\par Distant smoker\par Severe snoring  [ESS] : 9

## 2023-03-01 NOTE — DISCUSSION/SUMMARY
[FreeTextEntry1] : Assess\par \par Obesity \par MARLON\par \par Plan\par \par Weight loss\par Home Sleep test\par One month FU

## 2023-03-02 ENCOUNTER — APPOINTMENT (OUTPATIENT)
Dept: PULMONOLOGY | Facility: CLINIC | Age: 68
End: 2023-03-02
Payer: MEDICARE

## 2023-03-02 VITALS
OXYGEN SATURATION: 97 % | DIASTOLIC BLOOD PRESSURE: 68 MMHG | WEIGHT: 160 LBS | RESPIRATION RATE: 16 BRPM | SYSTOLIC BLOOD PRESSURE: 124 MMHG | HEIGHT: 61 IN | HEART RATE: 72 BPM | BODY MASS INDEX: 30.21 KG/M2

## 2023-03-02 DIAGNOSIS — G47.33 OBSTRUCTIVE SLEEP APNEA (ADULT) (PEDIATRIC): ICD-10-CM

## 2023-03-02 DIAGNOSIS — E66.9 OBESITY, UNSPECIFIED: ICD-10-CM

## 2023-03-02 PROCEDURE — 99213 OFFICE O/P EST LOW 20 MIN: CPT

## 2023-03-02 RX ORDER — CLINDAMYCIN HYDROCHLORIDE 300 MG/1
300 CAPSULE ORAL
Qty: 10 | Refills: 2 | Status: DISCONTINUED | COMMUNITY
Start: 2022-07-26 | End: 2023-03-02

## 2023-03-02 NOTE — DISCUSSION/SUMMARY
[FreeTextEntry1] : Assess\par \par Obesity \par Mild MARLON - Severe in supine and REM sleep \par \par Plan\par \par Weight loss\par Nasal APAP\par 3 month FU

## 2023-03-10 ENCOUNTER — RX RENEWAL (OUTPATIENT)
Age: 68
End: 2023-03-10

## 2023-03-20 ENCOUNTER — APPOINTMENT (OUTPATIENT)
Dept: ORTHOPEDIC SURGERY | Facility: CLINIC | Age: 68
End: 2023-03-20
Payer: MEDICARE

## 2023-03-20 PROCEDURE — 99214 OFFICE O/P EST MOD 30 MIN: CPT | Mod: 25

## 2023-03-20 PROCEDURE — 20610 DRAIN/INJ JOINT/BURSA W/O US: CPT | Mod: RT

## 2023-03-20 RX ADMIN — LIDOCAINE HYDROCHLORIDE 3 %: 10 INJECTION, SOLUTION EPIDURAL; INFILTRATION; INTRACAUDAL; PERINEURAL at 00:00

## 2023-03-20 RX ADMIN — Medication 0 MG/3ML: at 00:00

## 2023-03-20 NOTE — PHYSICAL EXAM
[Normal] : Gait: normal [LE] : Sensory: Intact in bilateral lower extremities [DP] : dorsalis pedis 2+ and symmetric bilaterally [Knee Swelling Right] : no swelling [Knee Tenderness On Palpation Right] : tenderness [Knee Motion Right Crepitus] : crepitus with ROM [Knee Motion Right] : full ROM [Knee Swelling Left] : swelling [Knee Tenderness On Palpation Left] : tenderness [Knee Motion Left Crepitus] : crepitus with ROM [Knee Motion Left] : full ROM

## 2023-03-20 NOTE — DISCUSSION/SUMMARY
[4 Weeks] : in 4 weeks [de-identified] : The patient was advised on the activities for today. I gave the patient instructions on postinjection ice and analgesia.\par The patient had her questions answered and gave a clear understanding of the instructions. She was advised she can call the office at any time with any questions or concerns\par  The patient will follow up in 1month

## 2023-03-20 NOTE — HISTORY OF PRESENT ILLNESS
[de-identified] : The patient is a 68 yr old female presents today for evaluation of the right knee pain and to discuss Gel-one injection to the right knee. She was last evaluated 2022 and was given a cortisone injection to the right knee with good relief. She is experiencing right knee pain at this time, mainly to the posterior aspect of the knee. She reports the pain to be a 6/10 stabbing at times. She is not using any medication for analgesia at this time. \par  The patient admits to an allergy to penicillins, Synthroid,and Levothyroxine and has no allergies to chicken, eggs or feathers.\par The patient was identified by name and  and the patient verifies that we are treating the right knee today. \par  [Pain Location] : pain [] : right knee [Worsening] : worsening [___ wks] : [unfilled] week(s) ago [3] : a minimum pain level of 3/10 [5] : a maximum pain level of 5/10 [Standing] : standing [Lifting] : lifting [Daily] : ~He/She~ states the symptoms seem to be occuring daily [Bending] : worsened by bending [Direct Pressure] : worsened by direct pressure [Walking] : worsened by walking [Knee Flexion] : worsened with knee flexion [Knee Extension] : worsened with knee extension [Ice] : relieved by ice [NSAIDs] : relieved by nonsteroidal anti-inflammatory drugs [Rest] : relieved by rest

## 2023-03-20 NOTE — REASON FOR VISIT
[Follow-Up Visit] : a follow-up visit for [FreeTextEntry2] : Right knee osteoarthritis. Had cortisone injection 8/12/22

## 2023-03-20 NOTE — PROCEDURE
[Injection] : Injection [Right] : of the right [Osteoarthritis] : Osteoarthritis [Joint Pain] : Joint pain [Patient] : patient [Risk] : risk [Benefits] : benefits [Bleeding] : bleeding [Infection] : infection [Verbal Consent Obtained] : verbal consent was obtained prior to the procedure [Lateral] : lateral [1% Lidocaine___(mL)] : [unfilled] mL of 1% Lidocaine [Bandage Applied] : a bandage [Tolerated Well] : The patient tolerated the procedure well [None] : none [___ Month(s)] : in [unfilled] month(s) [FreeTextEntry1] : chlorhexadine [FreeTextEntry8] : 3 ml Gel-one

## 2023-03-21 RX ORDER — HYALURONATE SOD, CROSS-LINKED 30 MG/3 ML
30 SYRINGE (ML) INTRAARTICULAR
Refills: 0 | Status: COMPLETED | OUTPATIENT
Start: 2023-03-20

## 2023-03-21 RX ORDER — LIDOCAINE HYDROCHLORIDE 10 MG/ML
1 INJECTION, SOLUTION INFILTRATION; PERINEURAL
Refills: 0 | Status: COMPLETED | OUTPATIENT
Start: 2023-03-20

## 2023-03-30 ENCOUNTER — APPOINTMENT (OUTPATIENT)
Dept: ORTHOPEDIC SURGERY | Facility: CLINIC | Age: 68
End: 2023-03-30
Payer: MEDICARE

## 2023-03-30 VITALS
WEIGHT: 160 LBS | HEIGHT: 61 IN | HEART RATE: 63 BPM | SYSTOLIC BLOOD PRESSURE: 120 MMHG | DIASTOLIC BLOOD PRESSURE: 77 MMHG | BODY MASS INDEX: 30.21 KG/M2

## 2023-03-30 PROCEDURE — 20610 DRAIN/INJ JOINT/BURSA W/O US: CPT | Mod: RT

## 2023-03-30 PROCEDURE — 99214 OFFICE O/P EST MOD 30 MIN: CPT | Mod: 25

## 2023-03-30 RX ORDER — METHYLPREDNISOLONE 4 MG/1
4 TABLET ORAL
Qty: 1 | Refills: 0 | Status: ACTIVE | COMMUNITY
Start: 2023-03-30 | End: 1900-01-01

## 2023-03-30 NOTE — HISTORY OF PRESENT ILLNESS
[de-identified] : The patient is a 68 yr old female presents today for evaluation of the right knee pain. She had a Gel-One injection administered on 3/20/2023 and has been experiencing worsening pain since. She reports the pain is radiating to the right thigh and shin. She is also experiencing a pain behind her knee\par She reports she is experiencing a sensation when anything touches her right lower extremity ( sheets or clothing)\par She has been using ice and Advil with minimal relief.\par  [Pain Location] : pain [Worsening] : worsening [___ days] : [unfilled] day(s) ago [Bending] : worsened by bending [Direct Pressure] : worsened by direct pressure [Knee Flexion] : worsened with knee flexion [Knee Extension] : worsened with knee extension [Ice] : relieved by ice [NSAIDs] : relieved by nonsteroidal anti-inflammatory drugs [Rest] : relieved by rest

## 2023-03-30 NOTE — PROCEDURE
[Right] : of the right [Prepatellar Bursa] : prepatellar bursa [Effusion] : Effusion [Osteoarthritis] : Osteoarthritis [Joint Pain] : Joint pain [Patient] : patient [Risk] : risk [Benefits] : benefits [Bleeding] : bleeding [Infection] : infection [Allergic Reaction] : allergic reaction [Verbal Consent Obtained] : verbal consent was obtained prior to the procedure [Ethyl Chloride Spray] : ethyl chloride spray was used as a topical anesthetic [Lateral] : lateral [20] : a 20-gauge [___ mL Fluid] : [unfilled] mL of [Bandage Applied] : a bandage [Tolerated Well] : The patient tolerated the procedure well [None] : none [FreeTextEntry1] : chlorhexadine

## 2023-03-30 NOTE — REASON FOR VISIT
[Follow-Up Visit] : a follow-up visit for [Knee Pain] : knee pain [Osteoarthritis, Knee] : osteoarthritis of the knee [Spouse] : spouse [FreeTextEntry2] : Right knee pain

## 2023-03-30 NOTE — DISCUSSION/SUMMARY
[Medication Risks Reviewed] : Medication risks reviewed [de-identified] : Patient was advised that the pain she is experiencing to the right knee is osteoarthritic pain.The pain may be as a result of the excess fluid administered last week. \par The patient was given a medrol dose pack for anti-inflammatory use. She was advised to alternate ice with the heat. She was also advised to rest for the next 48 hours\par If her symptoms continue, the patient will return to the office on Tuesday to be evaluated with Dr Cody.\par The patient had her questions answered and gave a clear understanding of the instructions. She was advised to call the office any time with any questions or concerns

## 2023-03-30 NOTE — PHYSICAL EXAM
[Antalgic] : antalgic [LE] : Sensory: Intact in bilateral lower extremities [DP] : dorsalis pedis 2+ and symmetric bilaterally [Knee Swelling Right] : no swelling [Knee Tenderness On Palpation Right] : tenderness [Knee Motion Right Crepitus] : crepitus with ROM [Knee Motion Right] : full ROM [FreeTextEntry2] : (+) small effusion noted to the lateral aspect of the right knee [de-identified] : No Radiographs were obtained at todays visit

## 2023-04-18 ENCOUNTER — APPOINTMENT (OUTPATIENT)
Dept: ORTHOPEDIC SURGERY | Facility: CLINIC | Age: 68
End: 2023-04-18
Payer: MEDICARE

## 2023-04-18 VITALS — DIASTOLIC BLOOD PRESSURE: 81 MMHG | HEART RATE: 72 BPM | SYSTOLIC BLOOD PRESSURE: 131 MMHG

## 2023-04-18 DIAGNOSIS — M25.569 PAIN IN UNSPECIFIED KNEE: ICD-10-CM

## 2023-04-18 DIAGNOSIS — T56.91XA TOXIC EFFECT OF UNSPECIFIED METAL, ACCIDENTAL (UNINTENTIONAL), INITIAL ENCOUNTER: ICD-10-CM

## 2023-04-18 PROCEDURE — 99214 OFFICE O/P EST MOD 30 MIN: CPT

## 2023-04-18 PROCEDURE — 73562 X-RAY EXAM OF KNEE 3: CPT | Mod: RT

## 2023-04-18 NOTE — PHYSICAL EXAM
[Antalgic] : antalgic [LE] : Sensory: Intact in bilateral lower extremities [DP] : dorsalis pedis 2+ and symmetric bilaterally [Knee Tenderness On Palpation Right] : tenderness [Knee Motion Right Crepitus] : crepitus with ROM [Knee Motion Right] : full ROM [Knee Swelling Right] : no swelling [Knee Swelling Left] : no swelling [Knee Tenderness On Palpation Left] : no tenderness [Knee Motion Left Crepitus] : no crepitus with ROM [Knee Motion Left] : limited ROM [de-identified] : Radiographs ,including 3 views of the right knee were obtained at today's visit. The Xray's reveal well maintained joint space between the medial and lateral femoral-tibial compartments slight joint space narrowing at the patellofemoral joint. No obvious evidence of fracture or dislocation noted

## 2023-04-18 NOTE — DISCUSSION/SUMMARY
[2 Weeks] : in 2 weeks [de-identified] : I reviewed the xrays and the case with Dr Cody. At this time he advised getting blood work , including metal ions - Chip, Cobalt, and Chromium considering the metal on metal implant to the right hip. Her symptoms may be referred pain.Patient verbalized understanding of all instructions and all of her questions were addressed to her satisfaction. Patient was advised to call this office if she has further questions or concerns. \par She will follow up with Dr Cody in 2 weeks\par

## 2023-04-18 NOTE — REASON FOR VISIT
[Follow-Up Visit] : a follow-up visit for [Knee Pain] : knee pain [FreeTextEntry2] : Right Knee Pain

## 2023-04-18 NOTE — HISTORY OF PRESENT ILLNESS
[Pain Location] : pain [___ mths] : [unfilled] month(s) ago [7] : a maximum pain level of 7/10 [Standing] : standing [Constant] : ~He/She~ states the symptoms seem to be constant [Bending] : worsened by bending [Hip Movement] : worsened by hip movement [Sitting] : worsened by sitting [Walking] : worsened by walking [Acetaminophen] : relieved by acetaminophen [NSAIDs] : relieved by nonsteroidal anti-inflammatory drugs [Rest] : relieved by rest [de-identified] : The patient is a 68 yr old female presents today for evaluation of the right lower extremity pain/symptoms.\par She received a Gel-one injection to the right knee on 3/20/2023 and has been experiencing pain to the right knee radiating to the shin, behind the right knee and up the right thigh.She reports right hip pain and feels that her right lower extremity gives way with certain movements.\par She reports the pain is a 7/10 with sitting, walking, and sleeping. She reports "It is painful all the time"\par She uses Motrin,Tylenol, and heat?ice with no relief.\par She reports the sheets are irritating to her lower extremity - she has increased the gabapentin but continues to experience the burning sensitivity.

## 2023-04-26 LAB
COBALT: 4.4 UG/L
CR BLD-MCNC: 5.9 UG/L
NICKEL: 2.5 UG/L

## 2023-05-05 ENCOUNTER — APPOINTMENT (OUTPATIENT)
Dept: ORTHOPEDIC SURGERY | Facility: CLINIC | Age: 68
End: 2023-05-05
Payer: MEDICARE

## 2023-05-05 VITALS — SYSTOLIC BLOOD PRESSURE: 158 MMHG | HEART RATE: 60 BPM | DIASTOLIC BLOOD PRESSURE: 80 MMHG

## 2023-05-05 PROCEDURE — 99212 OFFICE O/P EST SF 10 MIN: CPT

## 2023-05-30 ENCOUNTER — NON-APPOINTMENT (OUTPATIENT)
Age: 68
End: 2023-05-30

## 2023-06-06 ENCOUNTER — APPOINTMENT (OUTPATIENT)
Dept: PULMONOLOGY | Facility: CLINIC | Age: 68
End: 2023-06-06

## 2023-06-16 ENCOUNTER — APPOINTMENT (OUTPATIENT)
Dept: ORTHOPEDIC SURGERY | Facility: CLINIC | Age: 68
End: 2023-06-16
Payer: MEDICARE

## 2023-06-16 VITALS
HEART RATE: 62 BPM | DIASTOLIC BLOOD PRESSURE: 84 MMHG | WEIGHT: 160 LBS | BODY MASS INDEX: 30.21 KG/M2 | SYSTOLIC BLOOD PRESSURE: 137 MMHG | HEIGHT: 61 IN

## 2023-06-16 PROCEDURE — 99214 OFFICE O/P EST MOD 30 MIN: CPT | Mod: 25

## 2023-06-16 PROCEDURE — 20610 DRAIN/INJ JOINT/BURSA W/O US: CPT | Mod: RT

## 2023-06-16 RX ORDER — CELECOXIB 200 MG/1
200 CAPSULE ORAL TWICE DAILY
Qty: 60 | Refills: 3 | Status: ACTIVE | COMMUNITY
Start: 2023-06-16 | End: 1900-01-01

## 2023-06-16 NOTE — END OF VISIT
[Time Spent: ___ minutes] : I have spent [unfilled] minutes of time on the encounter. [FreeTextEntry3] : I, Dr. Marvel Cody MD, personally performed the evaluation and management services for this established patient who presented today with a new problem/exacerbation of an existing condition. That E/M includes conducting the examination, assessing all new/exacerbated conditions, and establishing a new plan of care. Today, MY ACP was here to assist with recording the history in my evaluation and management services of this new problem/exacerbated condition to be followed going forward.\par

## 2023-06-16 NOTE — PROCEDURE
[Aspiration] : Aspiration [Injection] : Injection [Right] : of the right [Knee Joint] : knee joint [Effusion] : Effusion [Patient] : patient [Risk] : risk [Benefits] : benefits [Alternatives] : alternatives [Bleeding] : bleeding [Infection] : infection [Allergic Reaction] : allergic reaction [Verbal Consent Obtained] : verbal consent was obtained prior to the procedure [Alcohol] : Alcohol [Ethyl Chloride Spray] : ethyl chloride spray was used as a topical anesthetic [Lateral] : lateral [Superior] : superior [18] : an 18-gauge [___ mL Fluid] : [unfilled] mL of [Yellow] : yellow [Clear] : clear [Same Needle/New Syringe] : the syringe was changed and the same needle was left in place and [1% Lidocaine___(mL)] : [unfilled] mL of 1% Lidocaine [Methylpred. 40mg/mL___(mL)] : [unfilled] mL of 40mg/mL methylprednisolone [Bandage Applied] : a bandage [Culture] : culture [Cell Count] : cell count [Gram Stain] : gram stain [Crystal Analysis] : crystal analysis [Tolerated Well] : The patient tolerated the procedure well [None] : none

## 2023-06-16 NOTE — HISTORY OF PRESENT ILLNESS
[Stable] : stable [de-identified] : Patient is a 68-year-old female who presents today for her right knee.  She has been seen in the past regarding her right knee.  And has been diagnosed with osteoarthritic changes along with a meniscal tear.  Back in March she did receive a intra-articular cortisone injection with gel 1.  However she states that shortly after the injection she noticed a marked increase in pain swelling and discomfort.  She states that she return back to the office approximately 1 week later for a knee aspiration.  However this was a dry tap.  She was then placed on medications for pain and advised to elevate and ice.  She states over the course of the last 3 months.  She noticed a pain in her knee with any strenuous activities.  She states that the pain was mostly anterior but did feel some light discomfort in her posterior aspect of the knee.  She states that the knee remains swollen with discomfort and has been taking Tylenol.  As this has persisted she notify the office and was referred back for a recent MRI of the right knee to fully evaluate the joint.  She presents today complaining of pain in her right knee with swelling.  And a feeling of stiffness and fullness.  She denies any specific injury or accident.

## 2023-06-16 NOTE — DISCUSSION/SUMMARY
[de-identified] : After thorough history full examination and review of the previous x-rays and MRI findings.  It was explained to the patient that she does have some joint effusion most probable due to a synovitis from the injection.  She was explained the different modalities of treatment but ultimately a knee aspiration with cortisone injection was given.  Prior to the injection the patient was explained the risk benefits pros and cons of the injections as well as alternatives.  She was also explained that there is no guarantee for complete relief and that this is not a cure for any arthritis or meniscal tears.  Patient did understand she also was told that the fluid collected will be sent to the lab for further evaluation to rule out any infectious or inflammatory process.  She was advised to continue with her current regimen of treatment.  Which includes ice packs/moist heat and anti-inflammatories.  Celebrex was prescribed to the pharmacy without incident.  She is advised to return back to the office in another month or so for reevaluation and management.  However if there is an increase in redness swelling heat discharge fever she is to notify the office sooner.\par \par I, Dr. Cody, personally performed the evaluation and management services for this established patient who presents today with an orthopedic condition. The evaluation and management includes conducting the conditions and establishing a plan of care.\par \par Today, my ACP [facility], was here to assist with the patient in my evaluation and management services for this condition which will be followed going forward.\par \par 35 minutes were spent, face to face, in direct consultation with the patient. This includes reviewing the natural history of their Dx., eliciting the history, performing an orthopedic exam, review of the x-ray findings, forming a differential Dx and discussing all treatment options. This Includes both surgical and non-surgical treatments. I also reviewed all the risks and benefits of non-operative & operative Tx options, future impact into orthopedic functions/problems, activity restrictions both at home and at work, and all follow up requirements.\par \par \par

## 2023-06-16 NOTE — PHYSICAL EXAM
[Normal] : Gait: normal [LE] : Sensory: Intact in bilateral lower extremities [ALL] : dorsalis pedis, posterior tibial, femoral, popliteal, and radial 2+ and symmetric bilaterally [de-identified] : On physical examination of the right knee.  The skin is clean dry and intact with no areas of redness heat or discharge noted.  There is some mild joint effusion noted.  There is diffuse pain and tenderness in all 3 compartments particularly in the patellofemoral as well as the lateral femoral-tibial compartment.  She does have good range of motion both passive and actively.  However with knee flexion elicits an increased discomfort.  There is no evidence of ligamentous laxity.  No evidence of any muscle atrophy.  No evidence of any motor or sensory deficit.  Patient does have good distal pulses with no calf tenderness. [de-identified] : No x-rays were performed today

## 2023-06-19 LAB
B PERT IGG+IGM PNL SER: CLEAR
COLOR FLD: YELLOW
EOSINOPHIL # FLD MANUAL: 0 %
FLUID INTAKE SUBSTANCE CLASS: NORMAL
LYMPHOCYTES # FLD MANUAL: 36 %
MESOTHL CELL NFR FLD: 0 %
MONOS+MACROS NFR FLD MANUAL: 59 %
NEUTS SEG # FLD MANUAL: 5 %
NRBC # FLD: 0 %
RBC # FLD MANUAL: 3000 /UL
SYCRY CLARITY: CLEAR
SYCRY COLOR: YELLOW
SYCRY ID: NORMAL
SYCRY TUBE: NORMAL
TOTAL CELLS COUNTED FLD: 184 /UL
TUBE TYPE: NORMAL
UNIDENT CELLS NFR FLD MANUAL: 0 %
VARIANT LYMPHS # FLD MANUAL: 0 %

## 2023-06-23 LAB — BACTERIA FLD CULT: NORMAL

## 2023-06-29 ENCOUNTER — NON-APPOINTMENT (OUTPATIENT)
Age: 68
End: 2023-06-29

## 2023-08-11 ENCOUNTER — RX RENEWAL (OUTPATIENT)
Age: 68
End: 2023-08-11

## 2023-10-18 ENCOUNTER — RX RENEWAL (OUTPATIENT)
Age: 68
End: 2023-10-18

## 2023-10-18 RX ORDER — GABAPENTIN 300 MG/1
300 CAPSULE ORAL TWICE DAILY
Qty: 60 | Refills: 1 | Status: ACTIVE | COMMUNITY
Start: 2021-05-12 | End: 1900-01-01

## 2024-04-11 ENCOUNTER — APPOINTMENT (OUTPATIENT)
Dept: ORTHOPEDIC SURGERY | Facility: CLINIC | Age: 69
End: 2024-04-11
Payer: MEDICARE

## 2024-04-11 VITALS
HEIGHT: 61 IN | SYSTOLIC BLOOD PRESSURE: 152 MMHG | DIASTOLIC BLOOD PRESSURE: 86 MMHG | WEIGHT: 198 LBS | HEART RATE: 58 BPM | BODY MASS INDEX: 37.38 KG/M2

## 2024-04-11 DIAGNOSIS — Z96.642 PRESENCE OF LEFT ARTIFICIAL HIP JOINT: ICD-10-CM

## 2024-04-11 DIAGNOSIS — Z96.641 PRESENCE OF RIGHT ARTIFICIAL HIP JOINT: ICD-10-CM

## 2024-04-11 DIAGNOSIS — M25.561 PAIN IN RIGHT KNEE: ICD-10-CM

## 2024-04-11 DIAGNOSIS — M47.816 SPONDYLOSIS W/OUT MYELOPATHY OR RADICULOPATHY, LUMBAR REGION: ICD-10-CM

## 2024-04-11 PROCEDURE — 99214 OFFICE O/P EST MOD 30 MIN: CPT | Mod: 25

## 2024-04-11 PROCEDURE — 73522 X-RAY EXAM HIPS BI 3-4 VIEWS: CPT

## 2024-04-11 PROCEDURE — 20610 DRAIN/INJ JOINT/BURSA W/O US: CPT | Mod: RT

## 2024-04-11 PROCEDURE — 72110 X-RAY EXAM L-2 SPINE 4/>VWS: CPT

## 2024-04-11 PROCEDURE — 73562 X-RAY EXAM OF KNEE 3: CPT | Mod: RT

## 2024-04-11 RX ORDER — METHYLPRED ACET/NACL,ISO-OS/PF 40 MG/ML
40 VIAL (ML) INJECTION
Refills: 0 | Status: COMPLETED | OUTPATIENT
Start: 2024-04-11

## 2024-04-11 RX ORDER — LIDOCAINE HYDROCHLORIDE 10 MG/ML
1 INJECTION, SOLUTION INFILTRATION; PERINEURAL
Qty: 0 | Refills: 0 | Status: COMPLETED | OUTPATIENT
Start: 2024-04-11

## 2024-04-11 RX ADMIN — METHYLPREDNISOLONE ACETATE 1 MG/ML: 40 INJECTION, SUSPENSION INTRA-ARTICULAR; INTRALESIONAL; INTRAMUSCULAR; SOFT TISSUE at 00:00

## 2024-04-11 RX ADMIN — LIDOCAINE HYDROCHLORIDE 3 %: 10 INJECTION, SOLUTION INFILTRATION; PERINEURAL at 00:00

## 2024-04-11 NOTE — REASON FOR VISIT
[Follow-Up Visit] : a follow-up visit for [Artificial Hip Joint] : an artificial hip joint [Hip Pain] : hip pain [Knee Pain] : knee pain [Spouse] : spouse [FreeTextEntry2] : Patient present for follow up of bilateral hip pain and right knee pain.

## 2024-04-11 NOTE — PHYSICAL EXAM
[Normal] : No costovertebral angle tenderness and no spinal tenderness [de-identified] : Physical exam reveals a pleasant female no apparent distress motor and sensory grossly intact to the lower extremities negative JOSSY test bilaterally Limited range of motion of the lumbar spine secondary to pain full range of motion of the right knee. [de-identified] : AP pelvis right and left lateral hip x-ray as well as 3 views of the right knee and 4 views of the lumbar spine were taken at today's visit x-rays of the hips reveal right and left well-placed anatomically orthopedic hardware from hip replacement.  The right knee does show moderate arthritic changes with some medial compartment patellofemoral space narrowing.  With regards to the lumbar spine no instability with flexion-extension views no signs of fractures tumors or infections significant and severe arthritic changes noted in the lumbar spine at multiple levels.

## 2024-04-11 NOTE — HISTORY OF PRESENT ILLNESS
[de-identified] : 69-year-old female presents the office for follow-up visit patient is status post a right hip replacement in 2009 and a left hip replacement in 2015 she has also been seen in the office for right knee pain she did have a viscosupplementation injection in the past which had to be aspirated secondary to swelling and then a cortisone injection was given which provided her with ample and adequate right knee pain relief.  She also complains of beltline back pain radiating to both the right and left hip with intermittent weakness traveling into the both thighs.

## 2024-04-11 NOTE — PROCEDURE
[Injection] : Injection [Right] : of the right [Inflammation] : Inflammation [Joint Pain] : Joint pain [Medial] : medial [22] : a 22-gauge [1% Lidocaine___(mL)] : [unfilled] mL of 1% Lidocaine [Methylpred. 40mg/mL___(mL)] : [unfilled] mL of 40mg/mL methylprednisolone [Tolerated Well] : The patient tolerated the procedure well

## 2024-04-11 NOTE — DISCUSSION/SUMMARY
[de-identified] : Patient did tolerate a right knee intra-articular injection at today's visit without complication.  We also recommend that she follow-up with orthopedic spine for a follow-up visit regarding her arthritic changes in the lumbar spine.  We will see her back in follow-up as needed regarding her right knee.

## 2024-04-11 NOTE — END OF VISIT
[FreeTextEntry3] : I, Dr. Marvel Cody MD, personally performed the evaluation and management services for this established patient who presented today with a new problem/exacerbation of an existing condition. That E/M includes conducting the examination, assessing all new/exacerbated conditions, and establishing a new plan of care. Today, MY ACP was here to assist with recording the history in my evaluation and management services of this new problem/exacerbated condition to be followed going forward.

## 2024-05-06 ENCOUNTER — RESULT REVIEW (OUTPATIENT)
Age: 69
End: 2024-05-06

## 2024-05-06 ENCOUNTER — APPOINTMENT (OUTPATIENT)
Dept: MRI IMAGING | Facility: CLINIC | Age: 69
End: 2024-05-06
Payer: MEDICARE

## 2024-05-06 ENCOUNTER — OUTPATIENT (OUTPATIENT)
Dept: OUTPATIENT SERVICES | Facility: HOSPITAL | Age: 69
LOS: 1 days | End: 2024-05-06
Payer: MEDICARE

## 2024-05-06 DIAGNOSIS — Z00.00 ENCOUNTER FOR GENERAL ADULT MEDICAL EXAMINATION WITHOUT ABNORMAL FINDINGS: ICD-10-CM

## 2024-05-06 DIAGNOSIS — Z96.649 PRESENCE OF UNSPECIFIED ARTIFICIAL HIP JOINT: Chronic | ICD-10-CM

## 2024-05-06 DIAGNOSIS — Z98.49 CATARACT EXTRACTION STATUS, UNSPECIFIED EYE: Chronic | ICD-10-CM

## 2024-05-06 PROCEDURE — 72148 MRI LUMBAR SPINE W/O DYE: CPT | Mod: 26,MH

## 2024-05-06 PROCEDURE — 72148 MRI LUMBAR SPINE W/O DYE: CPT | Mod: MH
